# Patient Record
Sex: FEMALE | Race: WHITE | Employment: OTHER | URBAN - METROPOLITAN AREA
[De-identification: names, ages, dates, MRNs, and addresses within clinical notes are randomized per-mention and may not be internally consistent; named-entity substitution may affect disease eponyms.]

---

## 2021-03-03 ENCOUNTER — HOSPITAL ENCOUNTER (OUTPATIENT)
Dept: PREADMISSION TESTING | Age: 51
Discharge: HOME | End: 2021-03-03

## 2021-03-03 VITALS
DIASTOLIC BLOOD PRESSURE: 68 MMHG | OXYGEN SATURATION: 97 % | BODY MASS INDEX: 24.09 KG/M2 | SYSTOLIC BLOOD PRESSURE: 128 MMHG | HEIGHT: 67 IN | HEART RATE: 64 BPM | TEMPERATURE: 97.5 F | WEIGHT: 153.5 LBS | RESPIRATION RATE: 16 BRPM

## 2021-03-03 RX ORDER — RIZATRIPTAN BENZOATE 10 MG/1
10 TABLET ORAL
COMMUNITY

## 2021-03-03 RX ORDER — IBUPROFEN 200 MG
200 TABLET ORAL
COMMUNITY

## 2021-03-03 RX ORDER — VALACYCLOVIR HYDROCHLORIDE 500 MG/1
500 TABLET, FILM COATED ORAL DAILY
COMMUNITY

## 2021-03-03 NOTE — PERIOP NOTES
Patient here for PAT for upcoming surgery with Dr Anne Wahl. Patient states she will be out of town on the date of her covid testing and requests to do covid testing elsewhere. Patient given instructions to have testing completed four days prior to sx using PCR testing ( not a rapid test). Patient notified surgery may be rescheduled if we do not receive results prior to sx. Email address given to patient to send results. Patient verbalized understanding.

## 2021-03-03 NOTE — H&P (VIEW-ONLY)
History and Physical 
 
Patient: Reza Unger MRN: [id number]  SSN: [id number] YOB: 1970  Age: 46 y.o. Sex: female Subjective:  
  
Reza Unger is a 46 y.o. female who notes she has had right foot pain for many years but the last 2 years the pain has become worse. She works from home and is able to wear comfortable shoes. She does note if she wears \"fancy\" shoes her foot will hurt more. Past Medical History:  
Diagnosis Date  Migraine Past Surgical History:  
Procedure Laterality Date  HX APPENDECTOMY  HX BREAST AUGMENTATION    
 HX COLONOSCOPY    
 HX TUBAL LIGATION No family history on file. Social History Tobacco Use  Smoking status: Never Smoker  Smokeless tobacco: Never Used Substance Use Topics  Alcohol use: Yes Alcohol/week: 4.0 standard drinks Types: 4 Shots of liquor per week Prior to Admission medications Medication Sig Start Date End Date Taking? Authorizing Provider  
valACYclovir (VALTREX) 500 mg tablet Take 500 mg by mouth daily. Yes Provider, Historical  
rizatriptan (MAXALT) 10 mg tablet Take 10 mg by mouth once as needed for Migraine. Yes Provider, Historical  
ibuprofen (MOTRIN) 200 mg tablet Take 200 mg by mouth daily as needed for Pain. Yes Provider, Historical  
  
 
Allergies Allergen Reactions  Morphine Rash Review of Systems: 
Constitutional: Negative for chills and fever HENT: Negative for congestion and sore throat Eyes: negative for blurred vision and double vision Respiratory: Negative for cough, shortness of breath and wheezing Mouth: Negative for loose, broken or chipped teeth. Cardiovascular: Negative for chest pain and palpitations Gastrointestinal: Negative for abdominal pain, constipation, diarrhea and nausea Genitourinary: Negative for dysuria and hematuria Musculoskeletal: Right foot pain Skin: Negative for rash, open wounds.   Negative for bruises easily Neurological: Negative for dizziness, tremors and headaches Psychiatric: Negative for depression. The patient is not nervous/anxious. Objective:  
 
Vitals:  
 03/03/21 1309 BP: 128/68 Pulse: 64 Resp: 16 Temp: 97.5 °F (36.4 °C) SpO2: 97% Weight: 69.6 kg (153 lb 8 oz) Height: 5' 7\" (1.702 m) Physical Exam: 
Constitutional:  Appears well,  No Acute Distress, Vitals noted Psychiatric:   Affect normal, Alert and Oriented to person/place/time Eyes:   Pupils equally round and reactive, EOMI, conjunctiva clear, eyelids normal 
ENT:   External ears and nose normal, teeth normal, gums normal, TMs and Orophyarynx normal 
Neck:   General inspection and Thyroid normal.  No abnormal cervical or supraclavicular nodes Lungs:   Clear to auscultation, good respiratory effort Heart: Ausculation normal.  Regular rhythm. No cardiac murmurs. No carotid bruits or palpable thrills Chest wall normal 
Musculoskeletal: Normal gait Extremities:   Without edema, good peripheral pulses Skin:   Warm to palpation, without rashes, bruising, or suspicious lesions No results found for this or any previous visit (from the past 72 hour(s)). Assessment:  
 
Right foot pain Plan: CHEILECTOMY WITH IMPLANT RIGHT FOOT, OSTEOTOMY OF HALLUX RIGHT FOOT with Dr. Kathi Alvarez on 3/12/21. Signed By: Myra Lynn NP March 3, 2021

## 2021-03-03 NOTE — H&P
History and Physical    Patient: Bruce Wasserman MRN: [id number]  SSN: [id number]    YOB: 1970  Age: 46 y.o. Sex: female      Subjective:      Bruce Wasserman is a 46 y.o. female who notes she has had right foot pain for many years but the last 2 years the pain has become worse. She works from home and is able to wear comfortable shoes. She does note if she wears \"fancy\" shoes her foot will hurt more. Past Medical History:   Diagnosis Date    Migraine      Past Surgical History:   Procedure Laterality Date    HX APPENDECTOMY      HX BREAST AUGMENTATION      HX COLONOSCOPY      HX TUBAL LIGATION        No family history on file. Social History     Tobacco Use    Smoking status: Never Smoker    Smokeless tobacco: Never Used   Substance Use Topics    Alcohol use: Yes     Alcohol/week: 4.0 standard drinks     Types: 4 Shots of liquor per week      Prior to Admission medications    Medication Sig Start Date End Date Taking? Authorizing Provider   valACYclovir (VALTREX) 500 mg tablet Take 500 mg by mouth daily. Yes Provider, Historical   rizatriptan (MAXALT) 10 mg tablet Take 10 mg by mouth once as needed for Migraine. Yes Provider, Historical   ibuprofen (MOTRIN) 200 mg tablet Take 200 mg by mouth daily as needed for Pain. Yes Provider, Historical        Allergies   Allergen Reactions    Morphine Rash       Review of Systems:  Constitutional: Negative for chills and fever  HENT: Negative for congestion and sore throat  Eyes: negative for blurred vision and double vision  Respiratory: Negative for cough, shortness of breath and wheezing  Mouth: Negative for loose, broken or chipped teeth. Cardiovascular: Negative for chest pain and palpitations  Gastrointestinal: Negative for abdominal pain, constipation, diarrhea and nausea  Genitourinary: Negative for dysuria and hematuria  Musculoskeletal: Right foot pain  Skin: Negative for rash, open wounds.   Negative for bruises easily  Neurological: Negative for dizziness, tremors and headaches  Psychiatric: Negative for depression. The patient is not nervous/anxious. Objective:     Vitals:    03/03/21 1309   BP: 128/68   Pulse: 64   Resp: 16   Temp: 97.5 °F (36.4 °C)   SpO2: 97%   Weight: 69.6 kg (153 lb 8 oz)   Height: 5' 7\" (1.702 m)        Physical Exam:  Constitutional:  Appears well,  No Acute Distress, Vitals noted  Psychiatric:   Affect normal, Alert and Oriented to person/place/time    Eyes:   Pupils equally round and reactive, EOMI, conjunctiva clear, eyelids normal  ENT:   External ears and nose normal, teeth normal, gums normal, TMs and Orophyarynx normal  Neck:   General inspection and Thyroid normal.  No abnormal cervical or supraclavicular nodes    Lungs:   Clear to auscultation, good respiratory effort  Heart: Ausculation normal.  Regular rhythm. No cardiac murmurs. No carotid bruits or palpable thrills  Chest wall normal  Musculoskeletal: Normal gait  Extremities:   Without edema, good peripheral pulses  Skin:   Warm to palpation, without rashes, bruising, or suspicious lesions     No results found for this or any previous visit (from the past 72 hour(s)). Assessment:     Right foot pain    Plan:     CHEILECTOMY WITH IMPLANT RIGHT FOOT, OSTEOTOMY OF HALLUX RIGHT FOOT with Dr. Todd Lemos on 3/12/21.     Signed By: Chrissy Walker NP     March 3, 2021

## 2021-03-03 NOTE — PERIOP NOTES
[address] [address] [facility]    PRE-ADMISSION TESTING    [phone]   Surgery Date: Friday 3/12/21       Is surgery arrival time given by surgeon? NO  If NO, [facility] staff will call you between 3 and 7pm the day before your surgery with your arrival time. (If your surgery is on a Monday, we will call you the Friday before.)    Call [phone] after 7pm Monday-Friday if you did not receive this call. INSTRUCTIONS BEFORE YOUR SURGERY   When You  Arrive Arrive at the [address] [facility] on the day of your surgery  Have your insurance card, photo ID, and any copayment (if needed)   Food   and   Drink NO food or drink after midnight the night before surgery    This means NO water, gum, mints, coffee, juice, etc.  No alcohol (beer, wine, liquor) 24 hours before and after surgery   Medications to   TAKE   Morning of Surgery MEDICATIONS TO TAKE THE MORNING OF SURGERY WITH A SIP OF WATER:    Rizatriptan if needed   valacyclovir if needed   Medications  To  STOP      7 days before surgery  Non-Steroidal anti-inflammatory Drugs (NSAID's): for example, Ibuprofen (Advil, Motrin), Naproxen (Aleve)   Aspirin, if taking for pain    Herbal supplements, vitamins, and fish oil   Other:  (Pain medications not listed above, including Tylenol may be taken)   Blood  Thinners  If you take  Aspirin, Plavix, Coumadin, or any blood-thinning or anti-blood clot medicine, talk to the doctor who prescribed the medications for pre-operative instructions. Bathing Clothing  Jewelry  Valuables      If you shower the morning of surgery, please do not apply anything to your skin (lotions, powders, deodorant, or makeup, especially mascara)   Follow Chlorhexidine [employer] body wash instructions provided to you during PAT appointment. Begin 3 days prior to surgery.    Do not shave or trim anywhere 24 hours before surgery   Wear your hair loose or down; no pony-tails, buns, or metal hair clips   Wear loose, comfortable, clean clothes   Wear glasses instead of contacts  [address], valuables, and jewelry, including body piercings, at home   Going Home - or Spending the Night  SAME-DAY SURGERY: You must have a responsible adult drive you home and stay with you 24 hours after surgery   ADMITS: If your doctor is keeping you in the hospital after surgery, leave personal belongings/luggage in your car until you have a hospital room number. Hospital discharge time is 12 noon  Drivers must be here before 12 noon unless you are told differently   Special Instructions It is now mandated that all surgical patients be tested for COVID-19 prior to surgery. Testing has to be exactly 4 days prior to surgery. Your COVID test date is Monday 3/8/21 between 8:00 am and 11:00 am.       COVID testing will be performed curbside at the [address] [place] [address] gaby. There will be signs leading you to the testing site. You will need to bring a photo ID with you to be swabbed. Patients are advised to self-quarantine at home after testing and prior to your surgery date. You will be notified if your results are positive. What to watch for:   Coronavirus (COVID-19) affects different people in different ways   It also appears with a wide range of symptoms from mild to severe   Signs usually appear 2-14 days after exposure     If you develop any of the following, notify your doctor immediately:  o Fever  o Chills, with or without a shiver  o Muscle pain  o Headache  o Sore throat  o Dry cough  o New loss of taste or smell  o Tiredness      If you develop any of the following, call 911:  o Shortness of breath  o Difficulty breathing  o Chest pain  o New confusion  o Blueness of fingers and/or lips     Follow all instructions so your surgery wont be cancelled. Please, be on time.                     If a situation occurs and you are delayed the day of surgery, call [phone]     If your physical condition changes (like a fever, cold, flu, etc.) call your surgeon. Home medication(s) reviewed and verified verbally with list  during PAT appointment. The patient was contacted by    IN-PERSON  The patient verbalizes understanding of all instructions and   DOES  NOT  need reinforcement.

## 2021-03-08 ENCOUNTER — HOSPITAL ENCOUNTER (OUTPATIENT)
Dept: PREADMISSION TESTING | Age: 51
Discharge: HOME | End: 2021-03-08

## 2021-03-11 ENCOUNTER — ANESTHESIA EVENT (OUTPATIENT)
Dept: SURGERY | Age: 51
End: 2021-03-11
Payer: COMMERCIAL

## 2021-03-11 NOTE — PERIOP NOTES
Left a VM for the patient inquiring about her covid test results. Requested a return call regarding this.   DOS: 3/12/2021

## 2021-03-11 NOTE — PERIOP NOTES
Spoke to patient via phone, who stated that she is still waiting on her covid test to result. Per the patient, she had a test on 3/9/2021 which did not result by yesterday so she had a 2nd test done at [facility] yesterday. Instructed patient to bring a paper copy of the final result with her tomorrow morning. The patient is aware that if the test does not result in time, there is a chance that the surgery would be rescheduled. The patient verbalized understanding.   DOS: 3/12/2021

## 2021-03-12 ENCOUNTER — HOSPITAL ENCOUNTER (OUTPATIENT)
Dept: GENERAL RADIOLOGY | Age: 51
Setting detail: OUTPATIENT SURGERY
Discharge: HOME | End: 2021-03-12
Attending: PODIATRIST | Admitting: PODIATRIST
Payer: COMMERCIAL

## 2021-03-12 ENCOUNTER — HOSPITAL ENCOUNTER (OUTPATIENT)
Age: 51
Setting detail: OUTPATIENT SURGERY
Discharge: HOME | End: 2021-03-12
Attending: PODIATRIST | Admitting: PODIATRIST
Payer: COMMERCIAL

## 2021-03-12 ENCOUNTER — ANESTHESIA (OUTPATIENT)
Dept: SURGERY | Age: 51
End: 2021-03-12
Payer: COMMERCIAL

## 2021-03-12 ENCOUNTER — APPOINTMENT (OUTPATIENT)
Dept: GENERAL RADIOLOGY | Age: 51
End: 2021-03-12
Attending: PODIATRIST
Payer: COMMERCIAL

## 2021-03-12 VITALS
BODY MASS INDEX: 24.08 KG/M2 | TEMPERATURE: 97.6 F | DIASTOLIC BLOOD PRESSURE: 49 MMHG | OXYGEN SATURATION: 98 % | HEIGHT: 67 IN | RESPIRATION RATE: 13 BRPM | HEART RATE: 55 BPM | WEIGHT: 153.44 LBS | SYSTOLIC BLOOD PRESSURE: 111 MMHG

## 2021-03-12 PROBLEM — M20.21 HALLUX RIGIDUS OF RIGHT FOOT: Status: ACTIVE | Noted: 2021-03-12

## 2021-03-12 PROBLEM — M79.671 FOOT PAIN, RIGHT: Status: ACTIVE | Noted: 2021-03-12

## 2021-03-12 PROBLEM — M20.61: Status: ACTIVE | Noted: 2021-03-12

## 2021-03-12 LAB
COVID-19 RAPID TEST, COVR: NOT DETECTED
HCG UR QL: NEGATIVE
SARS-COV-2, COV2: NORMAL
SOURCE, COVRS: NORMAL

## 2021-03-12 PROCEDURE — 64445 NJX AA&/STRD SCIATIC NRV IMG: CPT

## 2021-03-12 PROCEDURE — 77030013837 HC NERV BLK KT BBMI -B

## 2021-03-12 PROCEDURE — C1776 JOINT DEVICE (IMPLANTABLE): HCPCS | Performed by: PODIATRIST

## 2021-03-12 PROCEDURE — 74011250636 HC RX REV CODE- 250/636: Performed by: NURSE ANESTHETIST, CERTIFIED REGISTERED

## 2021-03-12 PROCEDURE — 81025 URINE PREGNANCY TEST: CPT

## 2021-03-12 PROCEDURE — 74011250636 HC RX REV CODE- 250/636: Performed by: ANESTHESIOLOGY

## 2021-03-12 PROCEDURE — 87635 SARS-COV-2 COVID-19 AMP PRB: CPT

## 2021-03-12 PROCEDURE — 77030040922 HC BLNKT HYPOTHRM STRY -A

## 2021-03-12 PROCEDURE — 74011000250 HC RX REV CODE- 250: Performed by: PODIATRIST

## 2021-03-12 PROCEDURE — 77030036714 HC WRE K FIXOS STRY -B: Performed by: PODIATRIST

## 2021-03-12 PROCEDURE — 77030003601 HC NDL NRV BLK BBMI -A

## 2021-03-12 PROCEDURE — 77030002916 HC SUT ETHLN J&J -A: Performed by: PODIATRIST

## 2021-03-12 PROCEDURE — C1713 ANCHOR/SCREW BN/BN,TIS/BN: HCPCS | Performed by: PODIATRIST

## 2021-03-12 PROCEDURE — 76210000020 HC REC RM PH II FIRST 0.5 HR: Performed by: PODIATRIST

## 2021-03-12 PROCEDURE — 76210000006 HC OR PH I REC 0.5 TO 1 HR: Performed by: PODIATRIST

## 2021-03-12 PROCEDURE — 76010000149 HC OR TIME 1 TO 1.5 HR: Performed by: PODIATRIST

## 2021-03-12 PROCEDURE — 77030031139 HC SUT VCRL2 J&J -A: Performed by: PODIATRIST

## 2021-03-12 PROCEDURE — 77030011992 HC AIRWY NASOPHGL TELE -A: Performed by: ANESTHESIOLOGY

## 2021-03-12 PROCEDURE — 77030040239: Performed by: PODIATRIST

## 2021-03-12 PROCEDURE — 74011250636 HC RX REV CODE- 250/636: Performed by: PODIATRIST

## 2021-03-12 PROCEDURE — 77030036687 HC SHOE PSTOP S2SG -A

## 2021-03-12 PROCEDURE — 77030018836 HC SOL IRR NACL ICUM -A: Performed by: PODIATRIST

## 2021-03-12 PROCEDURE — 74011000250 HC RX REV CODE- 250: Performed by: NURSE ANESTHETIST, CERTIFIED REGISTERED

## 2021-03-12 PROCEDURE — 77030000032 HC CUF TRNQT ZIMM -B: Performed by: PODIATRIST

## 2021-03-12 PROCEDURE — 74011250636 HC RX REV CODE- 250/636

## 2021-03-12 PROCEDURE — 2709999900 HC NON-CHARGEABLE SUPPLY: Performed by: PODIATRIST

## 2021-03-12 PROCEDURE — 76060000033 HC ANESTHESIA 1 TO 1.5 HR: Performed by: PODIATRIST

## 2021-03-12 PROCEDURE — 77030010507 HC ADH SKN DERMBND J&J -B: Performed by: ANESTHESIOLOGY

## 2021-03-12 PROCEDURE — 73620 X-RAY EXAM OF FOOT: CPT

## 2021-03-12 PROCEDURE — 74011000250 HC RX REV CODE- 250: Performed by: ANESTHESIOLOGY

## 2021-03-12 PROCEDURE — 77030040361 HC SLV COMPR DVT MDII -B

## 2021-03-12 DEVICE — CANNULATED SELF-TAPPING COMPRESSION SCREW
Type: IMPLANTABLE DEVICE | Site: FOOT | Status: FUNCTIONAL
Brand: FIXOS

## 2021-03-12 DEVICE — 8 MM SYNTHETIC CARTILAGE IMPLANT
Type: IMPLANTABLE DEVICE | Site: FOOT | Status: FUNCTIONAL
Brand: CARTIVA SYNTHETIC CARTILAGE IMPLANT

## 2021-03-12 RX ORDER — ROPIVACAINE HYDROCHLORIDE 5 MG/ML
INJECTION, SOLUTION EPIDURAL; INFILTRATION; PERINEURAL AS NEEDED
Status: DISCONTINUED | OUTPATIENT
Start: 2021-03-12 | End: 2021-03-12 | Stop reason: HOSPADM

## 2021-03-12 RX ORDER — DIPHENHYDRAMINE HYDROCHLORIDE 50 MG/ML
12.5 INJECTION, SOLUTION INTRAMUSCULAR; INTRAVENOUS AS NEEDED
Status: DISCONTINUED | OUTPATIENT
Start: 2021-03-12 | End: 2021-03-12 | Stop reason: HOSPADM

## 2021-03-12 RX ORDER — HYDROMORPHONE HYDROCHLORIDE 1 MG/ML
.25-1 INJECTION, SOLUTION INTRAMUSCULAR; INTRAVENOUS; SUBCUTANEOUS
Status: DISCONTINUED | OUTPATIENT
Start: 2021-03-12 | End: 2021-03-12 | Stop reason: HOSPADM

## 2021-03-12 RX ORDER — NALOXONE HYDROCHLORIDE 0.4 MG/ML
0.2 INJECTION, SOLUTION INTRAMUSCULAR; INTRAVENOUS; SUBCUTANEOUS
Status: DISCONTINUED | OUTPATIENT
Start: 2021-03-12 | End: 2021-03-12 | Stop reason: HOSPADM

## 2021-03-12 RX ORDER — BUPIVACAINE HYDROCHLORIDE 5 MG/ML
INJECTION, SOLUTION EPIDURAL; INTRACAUDAL AS NEEDED
Status: DISCONTINUED | OUTPATIENT
Start: 2021-03-12 | End: 2021-03-12 | Stop reason: HOSPADM

## 2021-03-12 RX ORDER — DEXAMETHASONE SODIUM PHOSPHATE 4 MG/ML
INJECTION, SOLUTION INTRA-ARTICULAR; INTRALESIONAL; INTRAMUSCULAR; INTRAVENOUS; SOFT TISSUE AS NEEDED
Status: DISCONTINUED | OUTPATIENT
Start: 2021-03-12 | End: 2021-03-12 | Stop reason: HOSPADM

## 2021-03-12 RX ORDER — LIDOCAINE HYDROCHLORIDE 10 MG/ML
0.1 INJECTION, SOLUTION EPIDURAL; INFILTRATION; INTRACAUDAL; PERINEURAL AS NEEDED
Status: DISCONTINUED | OUTPATIENT
Start: 2021-03-12 | End: 2021-03-12 | Stop reason: HOSPADM

## 2021-03-12 RX ORDER — MIDAZOLAM HYDROCHLORIDE 1 MG/ML
INJECTION, SOLUTION INTRAMUSCULAR; INTRAVENOUS AS NEEDED
Status: DISCONTINUED | OUTPATIENT
Start: 2021-03-12 | End: 2021-03-12 | Stop reason: HOSPADM

## 2021-03-12 RX ORDER — SODIUM CHLORIDE, SODIUM LACTATE, POTASSIUM CHLORIDE, CALCIUM CHLORIDE 600; 310; 30; 20 MG/100ML; MG/100ML; MG/100ML; MG/100ML
125 INJECTION, SOLUTION INTRAVENOUS CONTINUOUS
Status: DISCONTINUED | OUTPATIENT
Start: 2021-03-12 | End: 2021-03-12 | Stop reason: HOSPADM

## 2021-03-12 RX ORDER — FENTANYL CITRATE 50 UG/ML
INJECTION, SOLUTION INTRAMUSCULAR; INTRAVENOUS AS NEEDED
Status: DISCONTINUED | OUTPATIENT
Start: 2021-03-12 | End: 2021-03-12 | Stop reason: HOSPADM

## 2021-03-12 RX ORDER — FLUMAZENIL 0.1 MG/ML
0.2 INJECTION INTRAVENOUS
Status: DISCONTINUED | OUTPATIENT
Start: 2021-03-12 | End: 2021-03-12 | Stop reason: HOSPADM

## 2021-03-12 RX ORDER — PROPOFOL 10 MG/ML
INJECTION, EMULSION INTRAVENOUS
Status: DISCONTINUED | OUTPATIENT
Start: 2021-03-12 | End: 2021-03-12 | Stop reason: HOSPADM

## 2021-03-12 RX ORDER — SODIUM CHLORIDE, SODIUM LACTATE, POTASSIUM CHLORIDE, CALCIUM CHLORIDE 600; 310; 30; 20 MG/100ML; MG/100ML; MG/100ML; MG/100ML
INJECTION, SOLUTION INTRAVENOUS
Status: DISCONTINUED | OUTPATIENT
Start: 2021-03-12 | End: 2021-03-12 | Stop reason: HOSPADM

## 2021-03-12 RX ORDER — LIDOCAINE HYDROCHLORIDE 20 MG/ML
INJECTION, SOLUTION INFILTRATION; PERINEURAL AS NEEDED
Status: DISCONTINUED | OUTPATIENT
Start: 2021-03-12 | End: 2021-03-12 | Stop reason: HOSPADM

## 2021-03-12 RX ORDER — EPHEDRINE SULFATE/0.9% NACL/PF 50 MG/5 ML
SYRINGE (ML) INTRAVENOUS AS NEEDED
Status: DISCONTINUED | OUTPATIENT
Start: 2021-03-12 | End: 2021-03-12 | Stop reason: HOSPADM

## 2021-03-12 RX ADMIN — SODIUM CHLORIDE, POTASSIUM CHLORIDE, SODIUM LACTATE AND CALCIUM CHLORIDE: 600; 310; 30; 20 INJECTION, SOLUTION INTRAVENOUS at 09:10

## 2021-03-12 RX ADMIN — FENTANYL CITRATE 25 MCG: 0.05 INJECTION, SOLUTION INTRAMUSCULAR; INTRAVENOUS at 10:01

## 2021-03-12 RX ADMIN — SODIUM CHLORIDE, POTASSIUM CHLORIDE, SODIUM LACTATE AND CALCIUM CHLORIDE 125 ML/HR: 600; 310; 30; 20 INJECTION, SOLUTION INTRAVENOUS at 07:23

## 2021-03-12 RX ADMIN — FENTANYL CITRATE 25 MCG: 0.05 INJECTION, SOLUTION INTRAMUSCULAR; INTRAVENOUS at 09:45

## 2021-03-12 RX ADMIN — DEXAMETHASONE SODIUM PHOSPHATE 4 MG: 4 INJECTION, SOLUTION INTRAMUSCULAR; INTRAVENOUS at 10:06

## 2021-03-12 RX ADMIN — Medication 10 MG: at 10:09

## 2021-03-12 RX ADMIN — BUPIVACAINE HYDROCHLORIDE 10 ML/HR: 7.5 INJECTION, SOLUTION EPIDURAL; RETROBULBAR at 11:23

## 2021-03-12 RX ADMIN — FENTANYL CITRATE 50 MCG: 0.05 INJECTION, SOLUTION INTRAMUSCULAR; INTRAVENOUS at 08:51

## 2021-03-12 RX ADMIN — ROPIVACAINE HYDROCHLORIDE 30 ML: 5 INJECTION, SOLUTION EPIDURAL; INFILTRATION; PERINEURAL at 08:56

## 2021-03-12 RX ADMIN — ROPIVACAINE HYDROCHLORIDE 10 ML: 5 INJECTION, SOLUTION EPIDURAL; INFILTRATION; PERINEURAL at 09:01

## 2021-03-12 RX ADMIN — FENTANYL CITRATE 50 MCG: 0.05 INJECTION, SOLUTION INTRAMUSCULAR; INTRAVENOUS at 08:56

## 2021-03-12 RX ADMIN — MIDAZOLAM HYDROCHLORIDE 2 MG: 2 INJECTION, SOLUTION INTRAMUSCULAR; INTRAVENOUS at 08:51

## 2021-03-12 RX ADMIN — LIDOCAINE HYDROCHLORIDE 50 MG: 20 INJECTION, SOLUTION INFILTRATION; PERINEURAL at 09:44

## 2021-03-12 RX ADMIN — MIDAZOLAM HYDROCHLORIDE 1 MG: 2 INJECTION, SOLUTION INTRAMUSCULAR; INTRAVENOUS at 09:47

## 2021-03-12 RX ADMIN — CEFAZOLIN SODIUM 2 G: 1 POWDER, FOR SOLUTION INTRAMUSCULAR; INTRAVENOUS at 09:49

## 2021-03-12 RX ADMIN — PROPOFOL 75 MCG/KG/MIN: 10 INJECTION, EMULSION INTRAVENOUS at 09:44

## 2021-03-12 NOTE — DISCHARGE INSTRUCTIONS
Dr. Robin Minor. Rogelio, DPM FACFAS FACFAOM FACCWS    The [facility]  Post Operative Instructions: You have had a surgical procedure on your right foot. Fluids and Diet:  Begin with clear liquids, broth, dry toast, and crackers. If not nauseated then resume your regular pre-operative diet when you are ready    Medications: Take your prescriptions as directed  Start Aspirin Saturday. Take pain medication Friday night even if you do not have pain. If your pain is not severe then you may take the non-prescription medication that you normally take for aches and pains  You may resume your regularly scheduled medications (unless otherwise directed)  If any side effects or adverse reactions occur, discontinue the medication and contact your doctor. Review the patient drug information that is provided before you take any medication    Ambulation and Activity:  You are advised to go directly home from the hospital  Use crutches, walker, knee scooter as needed  You may not put weight on the operated foot. You should wear the surgical shoe at all times when awake. Avoid stairs if possible. Do not lift or move heavy objects  Do not drive until cleared by Dr. Elizabeth Medina and Wound Care Instructions:  Keep bandage clean and dry  Do not shower or bathe the operative extremity  Do not remove the bandage (unless otherwise directed)  Do not attempt to put anything between the cast or dressing and your skin, some itching is normal.    Ice and Elevation:  Elevate operative extremity as much as possible to reduce swelling and discomfort. Elevate with 2 pillows at or above the level of the heart for the first 72 hours. Ice:  [facility] dispensed insulated ice bag over the bandage 20 minutes of every hour while awake for the first 72 hours. You may behind the knee as well. Special Instructions: Call your doctor immediately if you develop any of the following.   Fever over 100 degrees by mouth - take your temperature daily until your first follow up visit. Pain not relieved by medication ordered  Swelling, increased redness, warmth, or hardness around operative area. Numb, tingling or cold toes. Toe(s) become white or bluish  Bandage becomes wet, soiled, or blood soaked (small amount of bleeding may be normal)  Increased or progressive drainage from surgical area. Follow up instructions: Your first post operative appointment is scheduled for Tuesday    Call Dr. Noemi Romero office if you have any questions or concerns. DISCHARGE SUMMARY from your Nurse      PATIENT INSTRUCTIONS    After general anesthesia or intravenous sedation, for 24 hours or while taking prescription Narcotics:  · Limit your activities  · Do not drive and operate hazardous machinery  · Do not make important personal or business decisions  · Do  not drink alcoholic beverages  · If you have not urinated within 8 hours after discharge, please contact your surgeon on call. Report the following to your surgeon:  · Excessive pain, swelling, redness or odor of or around the surgical area  · Temperature over 100.5  · Nausea and vomiting lasting longer than 4 hours or if unable to take medications  · Any signs of decreased circulation or nerve impairment to extremity: change in color, persistent  numbness, tingling, coldness or increase pain  · Any questions      GOOD HELP TO FIGHT AN INFECTION  Here are a few tip to help reduce the chance of getting an infection after surgery:   Wash Your Hands   Good handwashing is the most important thing you and your caregiver can do.  Wash before and after caring for any wounds. Dry your hand with a clean towel.  Wash with soap and water for at least 20 seconds. A TIP: sing the \"Happy Birthday\" song through one time while washing to help with the timing.  Use a hand  in between washings.      Shower   When your surgeon says it is OK to take a shower, use a new bar of antibacterial soap (if that is what you use, and keep that bar of soap ONLY for your use), or antibacterial body wash.  Use a clean wash cloth or sponge when you bathe.  Dry off with a clean towel  after every bath - be careful around any wounds, skin staples, sutures or surgical glue over/on wounds.  Do not enter swimming pools, hot tubs, lakes, rivers and/or ocean until wounds are healed and your doctor/surgeon says it is OK.  Use Clean Sheets   Sleep on freshly laundered sheets after your surgery.  Keep the surgery site covered with a clean, dry bandage (if instructed to do so). If the bandage becomes soiled, reapply a new, dry, clean bandage.  Do not allow pets to sleep with you while your wound is healing.  Lifestyle Modification and Controlling Your Blood Sugar   Smoking slows wound healing. Stop smoking and limit exposure to second-hand smoke.  High blood sugar slows wound healing. Eat a well-balanced diet to provide proper nutrition while healing   Monitor your blood sugar (if you are a diabetic) and take your medications as you are suppose to so you can control you blood sugar after surgery. COUGH AND DEEP BREATHE    Breathing deeply and coughing are very important exercises to do after surgery. Deep breathing and coughing open the little air tubes and air sacks in your lungs. You take deep breaths every day. You may not even notice - it is just something you do when you sigh or yawn. It is a natural exercise you do to keep these air passages open. After surgery, take deep breaths and cough, on purpose. DIRECTIONS:  · Take 10 to 15 slow deep breaths every hour while awake. · Breathe in deeply, and hold it for 2 seconds. · Exhale slowly through puckered lips, like blowing up a balloon. · After every 4th or 5th deep breath, hug your pillow to your chest or belly and give a hard, deep cough. Yes, it will probably hurt.   But doing this exercise is a very important part of healing after surgery. Take your pain medicine to help you do this exercise without too much pain. Coughing and deep breathing help prevent bronchitis and pneumonia after surgery. If you had chest or belly surgery, use a pillow as a \"hug jadiel\" and hold it tightly to your chest or belly when you cough. ANKLE PUMPS    Ankle pumps increase the circulation of oxygenated blood to your lower extremities and decrease your risk for circulation problems such as blood clots. They also stretch the muscles, tendons and ligaments in your foot and ankle, and prevent joint contracture in the ankle and foot, especially after surgeries on the legs. It is important to do ankle pump exercises regularly after surgery because immobility increases your risk for developing a blood clot. Your doctor may also have you take an Aspirin for the next few days as well. If your doctor did not ask you to take an Aspirin, consult with him before starting Aspirin therapy on your own. The exercise is quite simple. · Slowly point your foot forward, feeling the muscles on the top of your lower leg stretch, and hold this position for 5 seconds. · Next, pull your foot back toward you as far as possible, stretching the calf muscles, and hold that position for 5 seconds. · Repeat with the other foot. · Perform 10 repetitions every hour while awake for both ankles if possible (down and then up with the foot once is one repetition). You should feel gentle stretching of the muscles in your lower leg when doing this exercise. If you feel pain, or your range of motion is limited, don't push too hard. Only go the limit your joint and muscles will let you go. If you have increasing pain, progressively worsening leg warmth or swelling, STOP the exercise and call your doctor.            MEDICATION AND   SIDE EFFECT GUIDE    The [employer] MEDICATION AND SIDE EFFECT GUIDE was provided to the PATIENT AND CARE PROVIDER. Information provided includes instruction about drug purpose and common side effects for the following medications:   · Please take prescribed medications exactly as directed! These are general instructions for a healthy lifestyle:    *   Please give a list of your current medications to your Primary Care Provider. *   Please update this list whenever your medications are discontinued, doses are changed, or new medications (including over-the-counter products) are added. *   Please carry medication information at all times in case of emergency situations. About Smoking  No smoking / No tobacco products  Avoid exposure to second hand smoke     Surgeon General's Warning:  Quitting smoking now greatly reduces serious risk to your health. Obesity, smoking, and sedentary lifestyle greatly increases your risk for illness and disease. A healthy diet, regular physical exercise & weight monitoring are important for maintaining a healthy lifestyle. Congestive Heart Failure  You may be retaining fluid if you have a history of heart failure or if you experience any of the following symptoms:  Weight gain of 3 pounds or more overnight or 5 pounds in a week, increased swelling in your hands or feet or shortness of breath while lying flat in bed. Please call your doctor as soon as you notice any of these symptoms; do not wait until your next office visit. Recognize signs and symptoms of STROKE:  F -  Face looks uneven  A -  Arms unable to move or move evenly  S -  Speech slurred or non-existent  T -  Time-call 911 as soon as signs and symptoms begin-DO NOT go          back to bed or wait to see if you get better-TIME IS BRAIN. Warning Signs of HEART ATTACK   Call 911 if you have these symptoms:     Chest discomfort. Most heart attacks involve discomfort in the center of the chest that lasts more than a few minutes, or that goes away and comes back.  It can feel like uncomfortable pressure, squeezing, fullness, or pain.  Discomfort in other areas of the upper body. Symptoms can include pain or discomfort in one or both arms, the back, neck, jaw, or stomach.  Shortness of breath with or without chest discomfort.  Other signs may include breaking out in a cold sweat, nausea, or lightheadedness. Don't wait more than five minutes to call 911 - MINUTES MATTER! Fast action can save your life. Calling 911 is almost always the fastest way to get lifesaving treatment. Emergency Medical Services staff can begin treatment when they arrive -- up to an hour sooner than if someone gets to the hospital by car. Learning About Coronavirus [phone])  Coronavirus [phone]): Overview  What is coronavirus (COVID-19)? The coronavirus disease (COVID-19) is caused by a virus. It is an illness that was first found in [place], [place], in December 2019. It has since spread worldwide. The virus can cause fever, cough, and trouble breathing. In severe cases, it can cause pneumonia and make it hard to breathe without help. It can cause death. Coronaviruses are a large group of viruses. They cause the common cold. They also cause more serious illnesses like Middle East respiratory syndrome (MERS) and severe acute respiratory syndrome (SARS). COVID-19 is caused by a novel coronavirus. That means it's a new type that has not been seen in people before. This virus spreads person-to-person through droplets from coughing and sneezing. It can also spread when you are close to someone who is infected. And it can spread when you touch something that has the virus on it, such as a doorknob or a tabletop. What can you do to protect yourself from coronavirus (COVID-19)? The best way to protect yourself from getting sick is to:  · Avoid areas where there is an outbreak. · Avoid contact with people who may be infected. · Wash your hands often with soap or alcohol-based hand sanitizers.   · Avoid crowds and try to stay at least 6 feet away from other people. · Wash your hands often, especially after you cough or sneeze. Use soap and water, and scrub for at least 20 seconds. If soap and water aren't available, use an alcohol-based hand . · Avoid touching your mouth, nose, and eyes. What can you do to avoid spreading the virus to others? To help avoid spreading the virus to others:  · Cover your mouth with a tissue when you cough or sneeze. Then throw the tissue in the trash. · Use a disinfectant to clean things that you touch often. · Stay home if you are sick or have been exposed to the virus. Don't go to school, work, or public areas. And don't use public transportation. · If you are sick:  ? Leave your home only if you need to get medical care. But call the doctor's office first so they know you're coming. And wear a face mask, if you have one.  ? If you have a face mask, wear it whenever you're around other people. It can help stop the spread of the virus when you cough or sneeze. ? Clean and disinfect your home every day. Use household  and disinfectant wipes or sprays. Take special care to clean things that you grab with your hands. These include doorknobs, remote controls, phones, and handles on your refrigerator and microwave. And don't forget countertops, tabletops, bathrooms, and computer keyboards. When to call for help  Call 911 anytime you think you may need emergency care. For example, call if:  · You have severe trouble breathing. (You can't talk at all.)  · You have constant chest pain or pressure. · You are severely dizzy or lightheaded. · You are confused or can't think clearly. · Your face and lips have a blue color. · You pass out (lose consciousness) or are very hard to wake up. Call your doctor now if you develop symptoms such as:  · Shortness of breath. · Fever. · Cough.   If you need to get care, call ahead to the doctor's office for instructions before you go. Make sure you wear a face mask, if you have one, to prevent exposing other people to the virus. Where can you get the latest information? The following health organizations are tracking and studying this virus. Their websites contain the most up-to-date information. Shabbir Laboy also learn what to do if you think you may have been exposed to the virus. · U.S. Centers for Disease Control and Prevention (CDC): The CDC provides updated news about the disease and travel advice. The website also tells you how to prevent the spread of infection. www.cdc.gov  · World Health Organization [facility]): WHO offers information about the virus outbreaks. WHO also has travel advice. www.who.int  Current as of: April 1, 2020               Content Version: 12.4  © 2006-2020 Health[employer], Incorporated. Care instructions adapted under license by your healthcare professional. If you have questions about a medical condition or this instruction, always ask your healthcare professional. [address] any warranty or liability for your use of this information. The discharge information has been reviewed with the {PATIENT PARENT GUARDIAN:19124}. Any questions and concerns from the {PATIENT PARENT GUARDIAN:02034} have been addressed. The {PATIENT PARENT GUARDIAN:22720} verbalized understanding.         CONTENTS FOUND IN YOUR DISCHARGE ENVELOPE:  [x]     Surgeon and Hospital Discharge Instructions  [x]     [facility] Care Provider Card  []     Medication & Side Effect Guide            (your newly prescribed medications have been marked/highlighted showing the most common side effects from the classes of drugs on your prescriptions)  []     Medication Prescription(s) x *** ( [] These have been sent electronically to your pharmacy by your surgeon,   - OR -       your surgeon has already provided these to you during a previous/pre-op office visit)  []     Physical Therapy Prescription  []     Follow-up Appointment Cards  []     Surgery-related Pictures/Media  []     Pain block and/or block with On-Q Catheter from Anesthesia Service (information included in your instructions above)  []     Medical device information sheets/pamphlets from their    []     School/work excuse note. []     /parent work excuse note. The following personal items collected during your admission are returned to you:   Dental Appliance: Dental Appliances: None  Vision: Visual Aid: Glasses, With patient  Hearing Aid:    Jewelry: Jewelry: None  Clothing: Clothing: Other (comment)(street clothes)  Other Valuables:  Other Valuables: Cell Phone, Eyeglasses, With patient  Valuables sent to safe: Personal Items Sent to Safe: none

## 2021-03-12 NOTE — BRIEF OP NOTE
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  Item Serial No.  Lot No. LRB No. Used Action   METATARSAL DISPOSABLE INSTRUMENT SET   NA  [id number] Right 1 Implanted   JOINT METATRSL MTP 8MM --  - SNA  JOINT METATRSL MTP 8MM --  NA [employer] [id number] Right 1 Implanted   SCR BNE JOSSIE COMP 2.5X16MM -- FIXOS - SNA  SCR BNE JOSSIE COMP 2.5X16MM -- FIXOS NA [employer] HOWM_WD NA Right 1 Implanted   SCR BNE JOSSIE COMP 2.5X18MM -- FIXOS - SNA  SCR BNE JOSSIE COMP 2.5X18MM -- FIXOS NA [employer] HOWM_WD NA Right 1 Implanted       Drains: * No LDAs found *    Findings: denuded cartilage to 90% of first metatarsal head    Electronically Signed by Bernadine Washington DPM on 3/12/2021 at 11:11 AM

## 2021-03-12 NOTE — ANESTHESIA PREPROCEDURE EVALUATION
Relevant Problems   No relevant active problems       Anesthetic History   No history of anesthetic complications            Review of Systems / Medical History  Patient summary reviewed, nursing notes reviewed and pertinent labs reviewed    Pulmonary  Within defined limits                 Neuro/Psych         Headaches     Cardiovascular  Within defined limits                Exercise tolerance: >4 METS     GI/Hepatic/Renal  Within defined limits              Endo/Other  Within defined limits           Other Findings              Physical Exam    Airway  Mallampati: II    Neck ROM: normal range of motion   Mouth opening: Normal     Cardiovascular  Regular rate and rhythm,  S1 and S2 normal,  no murmur, click, rub, or gallop  Rhythm: regular  Rate: normal         Dental  No notable dental hx       Pulmonary  Breath sounds clear to auscultation               Abdominal  GI exam deferred       Other Findings            Anesthetic Plan    ASA: 2  Anesthesia type: MAC and regional - popliteal fossa block and saphenous block          Induction: Intravenous  Anesthetic plan and risks discussed with: Patient

## 2021-03-12 NOTE — PROGRESS NOTES
Physical Therapy - Patient screened for PT needs. She has had PT gait training [facility] RLE in January and has been managing safe mobility with crutches at home on level surfaces and stairs. PT services not needed.  Order completed  Aaron Brown, PT, DPT

## 2021-03-12 NOTE — ANESTHESIA PROCEDURE NOTES
Peripheral Block    Start time: 3/12/2021 8:51 AM  End time: 3/12/2021 9:01 AM  Performed by: Robert Mccullough MD  Authorized by: Robert Mccullough MD       Pre-procedure:   Indications: at surgeon's request and post-op pain management    Preanesthetic Checklist: patient identified, risks and benefits discussed, site marked, timeout performed, anesthesia consent given and patient being monitored    Timeout Time: 08:51          Block Type:   Block Type:  Popliteal and saphenous  Laterality:  Right  Monitoring:  Continuous pulse ox, frequent vital sign checks, heart rate, responsive to questions and oxygen  Injection Technique:  Continuous  Procedures: ultrasound guided and nerve stimulator    Patient Position: supine  Prep: chlorhexidine    Location:  Upper thigh  Needle Type:  Tuohy  Needle Gauge:  18 G  Needle Localization:  Nerve stimulator and ultrasound guidance    Assessment:  Number of attempts:  1  Injection Assessment:  Incremental injection every 5 mL, local visualized surrounding nerve on ultrasound, negative aspiration for blood, no paresthesia and no intravascular symptoms  Patient tolerance:  Patient tolerated the procedure well with no immediate complications  Saphenous block performed with ultrasound guidance; 4\" stimuplex 21g needle used, 10cc 0.5% ropivacaine injected slowly with intermittent aspiration.

## 2021-03-12 NOTE — OP NOTES
Operative Report    Patient: Antonietta Baumgarten MRN: [id number]  SSN: [id number]    YOB: 1970  Age: 46 y.o. 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 She saw several specialists who recommended fusion of the great toe joint. She attempted nonsurgical management with shoe changes, injections, PT, nsaids without resolution. She then saw me for another opinion. I discussed several options including fusion of the great toe joint. I also discussed cheilectomy with implant with osteotomy to correct hallux deformity. I discussed possible failure of this procedure and need for fusion at a later date. I discussed the procedures at length including the expected post operative course, risks, alternatives and potential complications. No guarantees given and patient elected to proceed. Procedure in Detail:    Patient received a popliteal and saphenous block in pre-operative holding. She was then brought to the operating room and placed supine on the operating room table. After adequate IV sedation was obtained, The right ankle and foot were then prepped and draped in the usual aseptic fashion.   Upon exsanguination of the right foot with an esmark bandage and placement of padding at the ankle, the pneumatic ankle tourniquet was inflated to 225mHG. Attention was then directed to the 1st metatarsal phalangeal joint on the right foot where a linear longitudinal incision was made medial and parallel to the extensor hallucis longus tendon. The incision was approximately 4cm in length. The incision was deepened through the subcutaneous tissues sharply and bluntly with care taken to identify and retract all vital neurovascular structures. All bleeders were cauterized as needed with the bovie. Next a linear longitudinal periosteal and capsular incision was then made within the original incision. The periosteal and capsular structures were then sharply dissected medially and dorsally and laterally thus exposing the head of the 1st metatarsal in the operative field. A large dorsal exostosis was noted on the head of the 1st metatarsal and the base of the proximal phalanx with intact normal cartilage noted to the joint surfaces. Soft tissue contractures released with McGlammry elevator. At this time, the dorsal, medial and lateral bony exostoses on the 1st met head and the proximal phalanx were removed with saw and ronjour, and then all rough areas were smoothed with the power rasp. The proximal phalanx was then inspected and the deformity was then noted to be reduced, and an increase in range of motion was noted to the first metatarsal-phalangeal joint of the right foot. Extensive thinning of cartilage noted to first metatarsal head. Next a guidewire was inserted centrally to the first metatarsal head a hole was reamed for an 8mm cartiva implant. A consuelo 2.5mm headless screw was then inserted behind the base of the reamed hole. The cartiva implant was then implanted and noted to improve joint alignment and improve motion. The surgical site was then copiously irrigated with normal saline.   The capsular and periosteal structures were then reapproximated with 3-0 vicryl in simple suture fashion. The subcutaneous tissues were reapproximated with 4-0 vicryl. The skin margins were reapproximated with 4-0 nylon. Osteotomy of the right hallux  Attention was then directed over the proximal phalanx of the right hallux. Incision was then made and deepened to exposed the phalanx. Next a transverse wedge type osteotomy was performed to the metaphysis of the proximal phalanx of the hallux with the base medial and apex lateral maintaining the lateral cortical bone. The interposing bone was then removed and passed from the field. The osteotomy was then closed using manual reduction and held in temporary fixation with a k-wire. Next a 2.5 mm [place] screw was inserted over the wire for permanent fixation. This was confirmed using fluoroscopy showing anatomic alignment of the first metatarsophalangeal joint. The wire was then removed and passed from the field. The wounds were then irrigated with copious amounts of normal saline and closed in layers. Fluoroscopy confirmed alignment of the joint and fixation. The pneumatic ankle tourniquet was then deflated and a prompted hyperemic response was then noted to the right foot. The patient tolerated the above anesthesia and surgical procedure well and left the operating room to the recovery area with vital signs stable and vascular status intact to the right foot, capillary fill time < 3 seconds to all digits on the right foot. She will then be discharged and follow up in my office next week. Estimated Blood Loss:  1mL    Tourniquet Time:   Total Tourniquet Time Documented: Ankle (Right) - 51 minutes  Total: Ankle (Right) - 51 minutes        Implants:   Implant Name Type Inv.  Item Serial No.  Lot No. LRB No. Used Action   METATARSAL DISPOSABLE INSTRUMENT SET   NA  [id number] Right 1 Implanted   JOINT METATRSL MTP 8MM --  - SNA  JOINT METATRSL MTP 8MM --  NA [employer] [id number] Right 1 Implanted   SCR BNE JOSSIE COMP 2.5X16MM -- FIXOS - SNA  SCR AUDELIAE JOSSIE COMP 2.5X16MM -- FIXOS NA [employer] HOWM[employer]WD NA Right 1 Implanted   SCR AUDELIAE JOSSIE COMP 2.5X18MM -- FIXOS - SNA  SCR BNE JOSSIE COMP 2.5X18MM -- FIXOS NA [employer] HOWM_WD NA Right 1 Implanted               Specimens: * No specimens in log *        Drains: None                Complications: None    Counts: Sponge and needle counts were correct times two.     Signed By:  Maximilian Gamboa DPM     March 12, 2021

## 2021-03-12 NOTE — ANESTHESIA POSTPROCEDURE EVALUATION
Procedure(s):  CHEILECTOMY WITH IMPLANT RIGHT FOOT, OSTEOTOMY OF HALLUX RIGHT FOOT (MAC/POP/SAPHENOUS WITH CATHETER). MAC, regional    Anesthesia Post Evaluation      Multimodal analgesia: multimodal analgesia not used between 6 hours prior to anesthesia start to PACU discharge  Patient location during evaluation: PACU  Patient participation: complete - patient participated  Level of consciousness: awake  Pain management: adequate  Airway patency: patent  Anesthetic complications: no  Cardiovascular status: acceptable, blood pressure returned to baseline and hemodynamically stable  Respiratory status: acceptable  Hydration status: acceptable  Post anesthesia nausea and vomiting:  controlled      INITIAL Post-op Vital signs:   Vitals Value Taken Time   /50 03/12/21 1135   Temp     Pulse 55 03/12/21 1140   Resp 13 03/12/21 1140   SpO2 98 % 03/12/21 1140   Vitals shown include unvalidated device data.

## 2021-03-12 NOTE — INTERVAL H&P NOTE
Update History & Physical 
 
The Patient's History and Physical of March 3, 2021 was reviewed with the patient and I examined the patient. There was no change. The surgical site was confirmed by the patient and me. The patient was counseled at length about the risks of tanner Covid-19 during their perioperative period and any recovery window from their procedure. The patient was made aware that tnaner Covid-19  may worsen their prognosis for recovering from their procedure and lend to a higher morbidity and/or mortality risk. All material risks, benefits, and reasonable alternatives including postponing the procedure were discussed. The patient does  wish to proceed with the procedure at this time. Plan:  The risk, benefits, expected outcome, and alternative to the recommended procedure have been discussed with the patient. Patient understands and wants to proceed with the procedure.  
 
Electronically signed by Rosa Maria Lockett DPM on 3/12/2021 at 9:30 AM

## 2021-03-15 NOTE — PERIOP NOTES
03/15/21, 9:04 AM    [facility]-Anesthesia Services Chart Audit - Patient Encounter Reviewed

## 2022-03-19 PROBLEM — M79.671 FOOT PAIN, RIGHT: Status: ACTIVE | Noted: 2021-03-12

## 2022-03-19 PROBLEM — M20.61: Status: ACTIVE | Noted: 2021-03-12

## 2022-03-19 PROBLEM — M20.21 HALLUX RIGIDUS OF RIGHT FOOT: Status: ACTIVE | Noted: 2021-03-12

## 2023-05-21 RX ORDER — IBUPROFEN 200 MG
200 TABLET ORAL DAILY PRN
COMMUNITY

## 2023-05-21 RX ORDER — RIZATRIPTAN BENZOATE 10 MG/1
10 TABLET ORAL
COMMUNITY

## 2023-05-21 RX ORDER — VALACYCLOVIR HYDROCHLORIDE 500 MG/1
500 TABLET, FILM COATED ORAL DAILY
COMMUNITY

## (undated) DEVICE — COVER LT HNDL PLAS RIG 1 PER PK

## (undated) DEVICE — GOWN,PREVENTION PLUS,XLN/2XL,ST,22/CS: Brand: MEDLINE

## (undated) DEVICE — DRAPE,REIN 53X77,STERILE: Brand: MEDLINE

## (undated) DEVICE — ROCKER SWITCH PENCIL BLADE ELECTRODE, HOLSTER: Brand: EDGE

## (undated) DEVICE — SURGICAL PROCEDURE PACK BASIN MAJ SET CUST NO CAUT

## (undated) DEVICE — SYR 10ML LUER LOK 1/5ML GRAD --

## (undated) DEVICE — STERILE POLYISOPRENE POWDER-FREE SURGICAL GLOVES: Brand: PROTEXIS

## (undated) DEVICE — Device

## (undated) DEVICE — PREP KIT PEEL PTCH POVIDONE IOD

## (undated) DEVICE — SOL IRRIGATION INJ NACL 0.9% 500ML BTL

## (undated) DEVICE — STRAP,POSITIONING,KNEE/BODY,FOAM,4X60": Brand: MEDLINE

## (undated) DEVICE — PACK,BASIC,SIRUS,V: Brand: MEDLINE

## (undated) DEVICE — DRSG GZ OIL EMUL CURAD 3X3 --

## (undated) DEVICE — INFECTION CONTROL KIT SYS

## (undated) DEVICE — STERILE POLYISOPRENE POWDER-FREE SURGICAL GLOVES WITH EMOLLIENT COATING: Brand: PROTEXIS

## (undated) DEVICE — DRAPE C ARM W54XL84IN MINI FOR OEC 6800

## (undated) DEVICE — SUTURE VCRL SZ 4-0 L27IN ABSRB UD L26MM SH 1/2 CIR J415H

## (undated) DEVICE — BANDAGE,ELASTIC,ESMARK,STERILE,4"X9',LF: Brand: MEDLINE

## (undated) DEVICE — SUTURE VCRL SZ 3-0 L27IN ABSRB UD L26MM SH 1/2 CIR J416H

## (undated) DEVICE — NEEDLE HYPO 25GA L1.5IN BVL ORIENTED ECLIPSE

## (undated) DEVICE — BANDAGE,GAUZE,BULKEE II,4.5"X4.1YD,STRL: Brand: MEDLINE

## (undated) DEVICE — SUTURE ETHLN SZ 4-0 L18IN NONABSORBABLE BLK L19MM PS-2 3/8 1667H

## (undated) DEVICE — DRAPE,EXTREMITY,89X128,STERILE: Brand: MEDLINE

## (undated) DEVICE — REM POLYHESIVE ADULT PATIENT RETURN ELECTRODE: Brand: VALLEYLAB

## (undated) DEVICE — BANDAGE COMPR W6INXL10YD ST M E WHITE/BEIGE

## (undated) DEVICE — ZIMMER® STERILE DISPOSABLE TOURNIQUET CUFF WITH PROTECTIVE SLEEVE AND PLC, DUAL PORT, SINGLE BLADDER, 18 IN. (46 CM)

## (undated) DEVICE — PREP SKN CHLRAPRP APL 26ML STR --

## (undated) DEVICE — SPONGE GZ W4XL4IN COT 12 PLY TYP VII WVN C FLD DSGN

## (undated) DEVICE — INTENDED FOR TISSUE SEPARATION, AND OTHER PROCEDURES THAT REQUIRE A SHARP SURGICAL BLADE TO PUNCTURE OR CUT.: Brand: BARD-PARKER ® CARBON RIB-BACK BLADES

## (undated) DEVICE — DRAPE,U/ SHT,SPLIT,PLAS,STERIL: Brand: MEDLINE